# Patient Record
Sex: FEMALE | Race: ASIAN | NOT HISPANIC OR LATINO | Employment: UNEMPLOYED | URBAN - METROPOLITAN AREA
[De-identification: names, ages, dates, MRNs, and addresses within clinical notes are randomized per-mention and may not be internally consistent; named-entity substitution may affect disease eponyms.]

---

## 2021-02-23 ENCOUNTER — TELEPHONE (OUTPATIENT)
Dept: SCHEDULING | Facility: IMAGING CENTER | Age: 21
End: 2021-02-23

## 2021-03-01 ENCOUNTER — TELEMEDICINE (OUTPATIENT)
Dept: MEDICAL GROUP | Facility: IMAGING CENTER | Age: 21
End: 2021-03-01
Payer: COMMERCIAL

## 2021-03-01 VITALS
TEMPERATURE: 97.7 F | BODY MASS INDEX: 25.72 KG/M2 | SYSTOLIC BLOOD PRESSURE: 119 MMHG | HEIGHT: 60 IN | WEIGHT: 131 LBS | DIASTOLIC BLOOD PRESSURE: 76 MMHG | HEART RATE: 76 BPM

## 2021-03-01 DIAGNOSIS — F32.1 CURRENT MODERATE EPISODE OF MAJOR DEPRESSIVE DISORDER WITHOUT PRIOR EPISODE (HCC): ICD-10-CM

## 2021-03-01 DIAGNOSIS — F41.9 ANXIETY: ICD-10-CM

## 2021-03-01 DIAGNOSIS — R53.83 OTHER FATIGUE: ICD-10-CM

## 2021-03-01 DIAGNOSIS — Z76.89 ENCOUNTER TO ESTABLISH CARE: ICD-10-CM

## 2021-03-01 PROCEDURE — 99204 OFFICE O/P NEW MOD 45 MIN: CPT | Mod: 95,CR | Performed by: PHYSICIAN ASSISTANT

## 2021-03-01 RX ORDER — ESCITALOPRAM OXALATE 5 MG/1
5 TABLET ORAL DAILY
Qty: 30 TABLET | Refills: 3 | Status: SHIPPED | OUTPATIENT
Start: 2021-03-01 | End: 2021-06-28

## 2021-03-01 ASSESSMENT — PATIENT HEALTH QUESTIONNAIRE - PHQ9
5. POOR APPETITE OR OVEREATING: 3 - NEARLY EVERY DAY
SUM OF ALL RESPONSES TO PHQ QUESTIONS 1-9: 21
CLINICAL INTERPRETATION OF PHQ2 SCORE: 5

## 2021-03-01 ASSESSMENT — ANXIETY QUESTIONNAIRES
GAD7 TOTAL SCORE: 10
6. BECOMING EASILY ANNOYED OR IRRITABLE: SEVERAL DAYS
7. FEELING AFRAID AS IF SOMETHING AWFUL MIGHT HAPPEN: SEVERAL DAYS
3. WORRYING TOO MUCH ABOUT DIFFERENT THINGS: SEVERAL DAYS
2. NOT BEING ABLE TO STOP OR CONTROL WORRYING: NEARLY EVERY DAY
1. FEELING NERVOUS, ANXIOUS, OR ON EDGE: NEARLY EVERY DAY
4. TROUBLE RELAXING: SEVERAL DAYS
5. BEING SO RESTLESS THAT IT IS HARD TO SIT STILL: NOT AT ALL

## 2021-03-01 ASSESSMENT — PAIN SCALES - GENERAL: PAINLEVEL: NO PAIN

## 2021-03-01 NOTE — PROGRESS NOTES
Virtual Visit: New Patient   This visit was conducted via Zoom using secure and encrypted videoconferencing technology. The patient was in a private location in the [facility].    The patient's identity was confirmed and verbal consent was obtained for this virtual visit.    Subjective:     CC:   Chief Complaint   Patient presents with   • Establish Care   • Anxiety     feeling anxious, x about 1 month    • Depression     possible referral to a therapist        Shania Ding is a 21 y.o. female presenting to establish care and to discuss the evaluation and management of:    Anxiety  Over the past two months she has noticed that she is feeling down and anxious. Feels like she is breaking down even over the smallest things. Has hopeless thoughts, but no suicidal ideation or plan. She has been trying to exercise more and doing yoga and breathing techniques. No drugs or alcohol.      Depression Screening    Little interest or pleasure in doing things?  2 - more than half the days   Feeling down, depressed , or hopeless? 3 - nearly every day   Trouble falling or staying asleep, or sleeping too much?  3 - nearly every day   Feeling tired or having little energy?  3 - nearly every day   Poor appetite or overeating?  3 - nearly every day   Feeling bad about yourself - or that you are a failure or have let yourself or your family down? 3 - nearly every day   Trouble concentrating on things, such as reading the newspaper or watching television? 2 - more than half the days   Moving or speaking so slowly that other people could have noticed.  Or the opposite - being so fidgety or restless that you have been moving around a lot more than usual?  0 - not at all   Thoughts that you would be better off dead, or of hurting yourself?  2 - more than half the days   Patient Health Questionnaire Score: 21       If depressive symptoms identified deferred to follow up visit unless specifically addressed in assesment and  plan.    Interpretation of PHQ-9 Total Score   Score Severity   1-4 No Depression   5-9 Mild Depression   10-14 Moderate Depression   15-19 Moderately Severe Depression   20-27 Severe Depression      GORGE-7 Questionnaire    Feeling nervous, anxious, or on edge: Nearly every day  Not being able to sop or control worrying: Nearly every day  Worrying too much about different things: Several days  Trouble relaxing: Several days  Being so restless that it's hard to sit still: Not at all  Becoming easily annoyed or irritable: Several days  Feeling afraid as if something awful might happen: Several days  Total: 10    Interpretation of GORGE 7 Total Score   Score Severity :  0-4 No Anxiety   5-9 Mild Anxiety  10-14 Moderate Anxiety  15-21 Severe Anxiety      ROS  See HPI  Constitutional: Negative for fever, chills and malaise/fatigue.   HENT: Negative for congestion.    Eyes: Negative for pain.   Respiratory: Negative for cough and shortness of breath.    Cardiovascular: Negative for leg swelling.   Gastrointestinal: Negative for nausea, vomiting, abdominal pain and diarrhea.   Genitourinary: Negative for dysuria and hematuria.   Skin: Negative for rash.   Neurological: Negative for dizziness, focal weakness and headaches.   Endo/Heme/Allergies: Does not bruise/bleed easily.   Psychiatric/Behavioral: Negative for depression.  The patient is not nervous/anxious.      No Known Allergies    Current medicines (including changes today)  Current Outpatient Medications   Medication Sig Dispense Refill   • Multiple Vitamin (MULTIVITAMIN PO) Take  by mouth.     • Ascorbic Acid (VITAMIN C PO) Take  by mouth.     • escitalopram (LEXAPRO) 5 MG tablet Take 1 tablet by mouth every day. 30 tablet 3     No current facility-administered medications for this visit.       She  has no past medical history on file.  She  has no past surgical history on file.      Family History   Problem Relation Age of Onset   • Hypertension Father      No family  status information on file.       Patient Active Problem List    Diagnosis Date Noted   • Anxiety 03/01/2021   • Other fatigue 03/01/2021   • Current moderate episode of major depressive disorder without prior episode (HCC) 03/01/2021          Objective:   /76   Pulse 76   Temp 36.5 °C (97.7 °F)   Ht 1.524 m (5')   Wt 59.4 kg (131 lb)   LMP 01/17/2021   BMI 25.58 kg/m²   RR 12    Physical Exam:  Constitutional: Alert, no distress, well-groomed.  Skin: No rashes in visible areas.  Eye: Round. Conjunctiva clear, lids normal. No icterus.   ENMT: Lips pink without lesions, good dentition, moist mucous membranes. Phonation normal.  Neck: No masses, no thyromegaly. Moves freely without pain.  Respiratory: Unlabored respiratory effort, no cough or audible wheeze  Psych: Alert and oriented x3, normal affect and mood.       Assessment and Plan:   The following treatment plan was discussed:     1. Encounter to establish care  - CBC WITH DIFFERENTIAL; Future  - Comp Metabolic Panel; Future  - TSH WITH REFLEX TO FT4; Future  - VITAMIN B12; Future  - VITAMIN D,25 HYDROXY; Future    2. Anxiety  Acute, uncontrolled. Trial SSRI. Side effects discussed. Will follow up in 6 weeks. See orders below.  - CBC WITH DIFFERENTIAL; Future  - Comp Metabolic Panel; Future  - escitalopram (LEXAPRO) 5 MG tablet; Take 1 tablet by mouth every day.  Dispense: 30 tablet; Refill: 3  - TSH WITH REFLEX TO FT4; Future  - VITAMIN B12; Future  - VITAMIN D,25 HYDROXY; Future  - REFERRAL TO PSYCHOLOGY  - REFERRAL TO PSYCHIATRY    3. Other fatigue  Likely related to depression and anxiety, will continue to monitor.  - CBC WITH DIFFERENTIAL; Future  - Comp Metabolic Panel; Future  - TSH WITH REFLEX TO FT4; Future  - VITAMIN B12; Future  - VITAMIN D,25 HYDROXY; Future  - REFERRAL TO PSYCHOLOGY  - REFERRAL TO PSYCHIATRY    4. Current moderate episode of major depressive disorder without prior episode (HCC)  Start Lexapro, psych eval. ER if suicidal  ideation or plan.  - CBC WITH DIFFERENTIAL; Future  - Comp Metabolic Panel; Future  - escitalopram (LEXAPRO) 5 MG tablet; Take 1 tablet by mouth every day.  Dispense: 30 tablet; Refill: 3  - TSH WITH REFLEX TO FT4; Future  - VITAMIN B12; Future  - VITAMIN D,25 HYDROXY; Future  - REFERRAL TO PSYCHOLOGY  - REFERRAL TO PSYCHIATRY    Follow-up: Return in about 6 weeks (around 4/12/2021) for Follow-up.         Gisela Shaw PA-C    Please note that this dictation was created using voice recognition software. I have made every reasonable attempt to correct obvious errors, but I expect that there are errors of grammar and possibly content that I did not discover before finalizing the note.

## 2021-03-01 NOTE — ASSESSMENT & PLAN NOTE
Over the past two months she has noticed that she is feeling down and anxious. Feels like she is breaking down even over the smallest things. Has hopeless thoughts, but no suicidal ideation or plan. She has been trying to exercise more and doing yoga and breathing techniques. No drugs or alcohol.

## 2021-03-03 ENCOUNTER — HOSPITAL ENCOUNTER (OUTPATIENT)
Dept: LAB | Facility: MEDICAL CENTER | Age: 21
End: 2021-03-03
Attending: PHYSICIAN ASSISTANT
Payer: COMMERCIAL

## 2021-03-03 DIAGNOSIS — F41.9 ANXIETY: ICD-10-CM

## 2021-03-03 DIAGNOSIS — Z76.89 ENCOUNTER TO ESTABLISH CARE: ICD-10-CM

## 2021-03-03 DIAGNOSIS — R53.83 OTHER FATIGUE: ICD-10-CM

## 2021-03-03 DIAGNOSIS — F32.1 CURRENT MODERATE EPISODE OF MAJOR DEPRESSIVE DISORDER WITHOUT PRIOR EPISODE (HCC): ICD-10-CM

## 2021-03-04 ENCOUNTER — HOSPITAL ENCOUNTER (OUTPATIENT)
Dept: LAB | Facility: MEDICAL CENTER | Age: 21
End: 2021-03-04
Attending: PHYSICIAN ASSISTANT
Payer: COMMERCIAL

## 2021-03-04 LAB
25(OH)D3 SERPL-MCNC: 30 NG/ML (ref 30–100)
ALBUMIN SERPL BCP-MCNC: 4.2 G/DL (ref 3.2–4.9)
ALBUMIN/GLOB SERPL: 1.3 G/DL
ALP SERPL-CCNC: 81 U/L (ref 30–99)
ALT SERPL-CCNC: 31 U/L (ref 2–50)
ANION GAP SERPL CALC-SCNC: 10 MMOL/L (ref 7–16)
AST SERPL-CCNC: 23 U/L (ref 12–45)
BASOPHILS # BLD AUTO: 1.8 % (ref 0–1.8)
BASOPHILS # BLD: 0.11 K/UL (ref 0–0.12)
BILIRUB SERPL-MCNC: 0.6 MG/DL (ref 0.1–1.5)
BUN SERPL-MCNC: 9 MG/DL (ref 8–22)
CALCIUM SERPL-MCNC: 9.3 MG/DL (ref 8.5–10.5)
CHLORIDE SERPL-SCNC: 101 MMOL/L (ref 96–112)
CO2 SERPL-SCNC: 24 MMOL/L (ref 20–33)
CREAT SERPL-MCNC: 0.51 MG/DL (ref 0.5–1.4)
EOSINOPHIL # BLD AUTO: 0.21 K/UL (ref 0–0.51)
EOSINOPHIL NFR BLD: 3.3 % (ref 0–6.9)
ERYTHROCYTE [DISTWIDTH] IN BLOOD BY AUTOMATED COUNT: 42.5 FL (ref 35.9–50)
FASTING STATUS PATIENT QL REPORTED: NORMAL
GLOBULIN SER CALC-MCNC: 3.3 G/DL (ref 1.9–3.5)
GLUCOSE SERPL-MCNC: 84 MG/DL (ref 65–99)
HCT VFR BLD AUTO: 42.7 % (ref 37–47)
HGB BLD-MCNC: 13.7 G/DL (ref 12–16)
IMM GRANULOCYTES # BLD AUTO: 0.01 K/UL (ref 0–0.11)
IMM GRANULOCYTES NFR BLD AUTO: 0.2 % (ref 0–0.9)
LYMPHOCYTES # BLD AUTO: 2.14 K/UL (ref 1–4.8)
LYMPHOCYTES NFR BLD: 34.1 % (ref 22–41)
MCH RBC QN AUTO: 27 PG (ref 27–33)
MCHC RBC AUTO-ENTMCNC: 32.1 G/DL (ref 33.6–35)
MCV RBC AUTO: 84.1 FL (ref 81.4–97.8)
MONOCYTES # BLD AUTO: 0.41 K/UL (ref 0–0.85)
MONOCYTES NFR BLD AUTO: 6.5 % (ref 0–13.4)
NEUTROPHILS # BLD AUTO: 3.4 K/UL (ref 2–7.15)
NEUTROPHILS NFR BLD: 54.1 % (ref 44–72)
NRBC # BLD AUTO: 0 K/UL
NRBC BLD-RTO: 0 /100 WBC
PLATELET # BLD AUTO: 325 K/UL (ref 164–446)
PMV BLD AUTO: 9.5 FL (ref 9–12.9)
POTASSIUM SERPL-SCNC: 4.1 MMOL/L (ref 3.6–5.5)
PROT SERPL-MCNC: 7.5 G/DL (ref 6–8.2)
RBC # BLD AUTO: 5.08 M/UL (ref 4.2–5.4)
SODIUM SERPL-SCNC: 135 MMOL/L (ref 135–145)
TSH SERPL DL<=0.005 MIU/L-ACNC: 1.3 UIU/ML (ref 0.38–5.33)
VIT B12 SERPL-MCNC: 682 PG/ML (ref 211–911)
WBC # BLD AUTO: 6.3 K/UL (ref 4.8–10.8)

## 2021-03-04 PROCEDURE — 36415 COLL VENOUS BLD VENIPUNCTURE: CPT

## 2021-03-04 PROCEDURE — 85025 COMPLETE CBC W/AUTO DIFF WBC: CPT

## 2021-03-04 PROCEDURE — 82306 VITAMIN D 25 HYDROXY: CPT

## 2021-03-04 PROCEDURE — 80053 COMPREHEN METABOLIC PANEL: CPT

## 2021-03-04 PROCEDURE — 84443 ASSAY THYROID STIM HORMONE: CPT

## 2021-03-04 PROCEDURE — 82607 VITAMIN B-12: CPT

## 2021-04-06 ENCOUNTER — IMMUNIZATION (OUTPATIENT)
Dept: FAMILY PLANNING/WOMEN'S HEALTH CLINIC | Facility: IMMUNIZATION CENTER | Age: 21
End: 2021-04-06
Payer: COMMERCIAL

## 2021-04-06 DIAGNOSIS — Z23 ENCOUNTER FOR VACCINATION: Primary | ICD-10-CM

## 2021-04-06 PROCEDURE — 0001A PFIZER SARS-COV-2 VACCINE: CPT | Performed by: INTERNAL MEDICINE

## 2021-04-06 PROCEDURE — 91300 PFIZER SARS-COV-2 VACCINE: CPT | Performed by: INTERNAL MEDICINE

## 2021-04-24 ENCOUNTER — IMMUNIZATION (OUTPATIENT)
Dept: FAMILY PLANNING/WOMEN'S HEALTH CLINIC | Facility: IMMUNIZATION CENTER | Age: 21
End: 2021-04-24
Payer: COMMERCIAL

## 2021-04-24 DIAGNOSIS — Z23 ENCOUNTER FOR VACCINATION: Primary | ICD-10-CM

## 2021-04-24 PROCEDURE — 0002A PFIZER SARS-COV-2 VACCINE: CPT

## 2021-04-24 PROCEDURE — 91300 PFIZER SARS-COV-2 VACCINE: CPT

## 2021-05-10 ENCOUNTER — APPOINTMENT (OUTPATIENT)
Dept: BEHAVIORAL HEALTH | Facility: CLINIC | Age: 21
End: 2021-05-10
Payer: COMMERCIAL

## 2021-10-26 ENCOUNTER — TELEMEDICINE (OUTPATIENT)
Dept: BEHAVIORAL HEALTH | Facility: CLINIC | Age: 21
End: 2021-10-26
Payer: COMMERCIAL

## 2021-10-26 DIAGNOSIS — F43.20 ADJUSTMENT DISORDER, UNSPECIFIED TYPE: ICD-10-CM

## 2021-10-26 PROCEDURE — 90791 PSYCH DIAGNOSTIC EVALUATION: CPT | Performed by: SOCIAL WORKER
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
## 2021-11-01 ASSESSMENT — PATIENT HEALTH QUESTIONNAIRE - PHQ9
CLINICAL INTERPRETATION OF PHQ2 SCORE: 2
5. POOR APPETITE OR OVEREATING: 1 - SEVERAL DAYS
SUM OF ALL RESPONSES TO PHQ QUESTIONS 1-9: 8